# Patient Record
Sex: MALE | Race: WHITE | NOT HISPANIC OR LATINO | Employment: UNEMPLOYED | URBAN - METROPOLITAN AREA
[De-identification: names, ages, dates, MRNs, and addresses within clinical notes are randomized per-mention and may not be internally consistent; named-entity substitution may affect disease eponyms.]

---

## 2017-03-20 ENCOUNTER — OFFICE VISIT (OUTPATIENT)
Dept: SURGERY | Facility: CLINIC | Age: 27
End: 2017-03-20

## 2017-03-20 VITALS — OXYGEN SATURATION: 98 % | HEIGHT: 73 IN | BODY MASS INDEX: 32.68 KG/M2 | HEART RATE: 55 BPM | WEIGHT: 246.6 LBS

## 2017-03-20 DIAGNOSIS — N50.811 TESTICULAR PAIN, RIGHT: Primary | ICD-10-CM

## 2017-03-20 PROCEDURE — 99203 OFFICE O/P NEW LOW 30 MIN: CPT | Performed by: SURGERY

## 2017-03-20 RX ORDER — OSELTAMIVIR PHOSPHATE 75 MG/1
CAPSULE ORAL
Refills: 0 | COMMUNITY
Start: 2017-02-22 | End: 2017-03-20

## 2017-03-20 NOTE — PROGRESS NOTES
Chief Complaint   Patient presents with   • Hernia     NP Right inguinal hernia, painful        Subjective   Evaristo CECELIA Mark is a 27 y.o. male who presents for evaluation of some right-sided flank and lateral abdominal wall pain as well as right testicular pain.  The patient says this is been ongoing for about 2-1/2 weeks.  The pain at its worst is moderate.  It does not appear to be associated with any lifting or straining.  He does say that walking and being active may sometimes exacerbated, although not always.  Resting and laying around may make it better.  He describes an occasional burning sensation of the right testicle as well as a feeling almost like he has been kicked in the groin.  He does say that he has been involved in some different weightlifting exercises recently.      The following portions of the patient's history were reviewed and updated as appropriate: allergies, current medications, past family history, past medical history, past social history and past surgical history.     History reviewed. No pertinent past medical history.    Although appendectomy as listed in his history on the chart and the computer, the patient denies this to me.  He states that his only surgery was ankle fracture repair.    Past Surgical History   Procedure Laterality Date   • Appendectomy N/A 2014   • Orif ankle fracture Right 2014     Ankle fracture repair with hardware placed       No current outpatient prescriptions on file.    No Known Allergies    Family History   Problem Relation Age of Onset   • Hyperlipidemia Father    • Heart failure Father    • Heart failure Paternal Grandfather        Social History     Social History   • Marital status: Single     Spouse name: N/A   • Number of children: N/A   • Years of education: N/A     Occupational History   • Not on file.     Social History Main Topics   • Smoking status: Former Smoker     Years: 2.00     Types: Cigarettes     Quit date: 01/2017   • Smokeless tobacco:  "Not on file   • Alcohol use Yes      Comment: 1 drink on the weekend    • Drug use: Yes     Special: Marijuana   • Sexual activity: Defer     Other Topics Concern   • Not on file     Social History Narrative       Review of Systems   Constitutional: Positive for activity change. Negative for appetite change, fever and unexpected weight change.   Genitourinary: Positive for testicular pain. Negative for difficulty urinating, discharge, dysuria, genital sores and urgency.       Objective     Visit Vitals   • Pulse 55   • Ht 73\" (185.4 cm)   • Wt 246 lb 9.6 oz (112 kg)   • SpO2 98%   • BMI 32.53 kg/m2       Physical Exam   Constitutional: He appears well-developed and well-nourished.   Eyes: Conjunctivae and EOM are normal.   Neck: Neck supple. No tracheal deviation present.   Abdominal: Soft. Bowel sounds are normal. He exhibits no distension and no mass. There is no tenderness. Hernia confirmed negative in the ventral area, confirmed negative in the right inguinal area and confirmed negative in the left inguinal area.   Genitourinary: Penis normal. Right testis shows tenderness. Right testis shows no mass and no swelling. Left testis shows no mass and no tenderness.   Musculoskeletal: Normal range of motion. He exhibits no edema or deformity.   Psychiatric: He has a normal mood and affect. His behavior is normal. Thought content normal.           Assessment/Plan      This is a 27-year-old gentleman with what strikes me as being musculoskeletal pain is lateral abdominal wall on the right side with some radiation of this pain into the scrotum and testicle.  I think that some of the changes in his exercise regimen of likely brought this.  On physical examination I see no evidence of bulge or inguinal hernia.  He has no exacerbation of this pain when he is doing strenuous work and has never noticed a bulge.  I recommended to him rest and ibuprofen.  He may follow up with me as needed.       Kaiser Lowery MD  General " and Endoscopic Surgery  [facility]    [address], [address]  [place], [place], [address]  P: [phone]  F: [phone]

## 2017-08-28 ENCOUNTER — OFFICE VISIT (OUTPATIENT)
Dept: FAMILY MEDICINE CLINIC | Facility: CLINIC | Age: 27
End: 2017-08-28

## 2017-08-28 VITALS
TEMPERATURE: 98.1 F | HEIGHT: 74 IN | SYSTOLIC BLOOD PRESSURE: 137 MMHG | HEART RATE: 84 BPM | WEIGHT: 244 LBS | BODY MASS INDEX: 31.32 KG/M2 | DIASTOLIC BLOOD PRESSURE: 81 MMHG | RESPIRATION RATE: 16 BRPM

## 2017-08-28 DIAGNOSIS — F33.0 MILD EPISODE OF RECURRENT MAJOR DEPRESSIVE DISORDER (HCC): Primary | ICD-10-CM

## 2017-08-28 PROBLEM — F32.A DEPRESSION: Status: ACTIVE | Noted: 2017-08-28

## 2017-08-28 PROCEDURE — 99213 OFFICE O/P EST LOW 20 MIN: CPT | Performed by: FAMILY MEDICINE

## 2017-08-28 RX ORDER — PAROXETINE HYDROCHLORIDE 20 MG/1
20 TABLET, FILM COATED ORAL EVERY MORNING
Qty: 30 TABLET | Refills: 5 | Status: SHIPPED | OUTPATIENT
Start: 2017-08-28 | End: 2018-02-26 | Stop reason: SDUPTHER

## 2017-08-28 NOTE — PROGRESS NOTES
"Subjective   Evaristo Flores is a 27 y.o. male.     CC: Anxiety/Depression    History of Present Illness     Pt comes in today to discuss the above. Has had some increased depressive sx for some time and did start taking his GF's Paxil and is doing much with this. No SI/HI.    The following portions of the patient's history were reviewed and updated as appropriate: allergies, current medications, past family history, past medical history, past social history, past surgical history and problem list.    Review of Systems   Constitutional: Negative for activity change, chills, fatigue and fever.   Respiratory: Negative for cough and shortness of breath.    Cardiovascular: Negative for chest pain and palpitations.   Gastrointestinal: Negative for abdominal pain.   Endocrine: Negative for cold intolerance.   Psychiatric/Behavioral: Positive for dysphoric mood. Negative for behavioral problems. The patient is nervous/anxious.      /81  Pulse 84  Temp 98.1 °F (36.7 °C) (Oral)   Resp 16  Ht 74\" (188 cm)  Wt 244 lb (111 kg)  BMI 31.33 kg/m2    Objective   Physical Exam   Constitutional: He appears well-developed and well-nourished.   Neck: Neck supple. No thyromegaly present.   Cardiovascular: Normal rate and regular rhythm.    No murmur heard.  Pulmonary/Chest: Effort normal and breath sounds normal.   Abdominal: Bowel sounds are normal.   Psychiatric: He has a normal mood and affect. His behavior is normal.   Nursing note and vitals reviewed.      Assessment/Plan   Evaristo was seen today for anxiety and depression.    Diagnoses and all orders for this visit:    Mild episode of recurrent major depressive disorder  -     PARoxetine (PAXIL) 20 MG tablet; Take 1 tablet by mouth Every Morning.               "

## 2018-02-26 ENCOUNTER — OFFICE VISIT (OUTPATIENT)
Dept: FAMILY MEDICINE CLINIC | Facility: CLINIC | Age: 28
End: 2018-02-26

## 2018-02-26 VITALS
HEART RATE: 70 BPM | RESPIRATION RATE: 16 BRPM | HEIGHT: 73 IN | DIASTOLIC BLOOD PRESSURE: 74 MMHG | TEMPERATURE: 98.3 F | BODY MASS INDEX: 34.19 KG/M2 | WEIGHT: 258 LBS | SYSTOLIC BLOOD PRESSURE: 134 MMHG

## 2018-02-26 DIAGNOSIS — F33.0 MILD EPISODE OF RECURRENT MAJOR DEPRESSIVE DISORDER (HCC): ICD-10-CM

## 2018-02-26 PROCEDURE — 99212 OFFICE O/P EST SF 10 MIN: CPT | Performed by: FAMILY MEDICINE

## 2018-02-26 RX ORDER — PAROXETINE HYDROCHLORIDE 20 MG/1
20 TABLET, FILM COATED ORAL EVERY MORNING
Qty: 30 TABLET | Refills: 11 | Status: SHIPPED | OUTPATIENT
Start: 2018-02-26

## 2018-02-26 NOTE — PROGRESS NOTES
"Subjective   Evaristo Flores is a 28 y.o. male.     History of Present Illness     Chief Complaint:   Chief Complaint   Patient presents with   • Depression       Evaristo Flores 28 y.o. male who presents today for Medical Management of the below listed issues and medication refills.  he has a problem list of   Patient Active Problem List   Diagnosis   • Depression   .  Since the last visit, he has overall felt well.  he has been compliant with   Current Outpatient Prescriptions:   •  PARoxetine (PAXIL) 20 MG tablet, Take 1 tablet by mouth Every Morning., Disp: 30 tablet, Rfl: 11.  he denies medication side effects.    All of the chronic condition(s) listed above are stable w/o issues.    /74  Pulse 70  Temp 98.3 °F (36.8 °C) (Oral)   Resp 16  Ht 185.4 cm (73\")  Wt 117 kg (258 lb)  BMI 34.04 kg/m2    No results found for this or any previous visit.        The following portions of the patient's history were reviewed and updated as appropriate: allergies, current medications, past family history, past medical history, past social history, past surgical history and problem list.    Review of Systems   Constitutional: Negative for activity change, chills, fatigue and fever.   Respiratory: Negative for cough and shortness of breath.    Cardiovascular: Negative for chest pain and palpitations.   Gastrointestinal: Negative for abdominal pain.   Endocrine: Negative for cold intolerance.   Psychiatric/Behavioral: Negative for behavioral problems and dysphoric mood. The patient is not nervous/anxious.        Objective   Physical Exam   Constitutional: He appears well-developed and well-nourished.   Neck: Neck supple. No thyromegaly present.   Cardiovascular: Normal rate and regular rhythm.    No murmur heard.  Pulmonary/Chest: Effort normal and breath sounds normal.   Abdominal: Bowel sounds are normal.   Psychiatric: He has a normal mood and affect. His behavior is normal.   Nursing note and vitals " reviewed.      Assessment/Plan   Evaristo was seen today for depression.    Diagnoses and all orders for this visit:    Mild episode of recurrent major depressive disorder  -     PARoxetine (PAXIL) 20 MG tablet; Take 1 tablet by mouth Every Morning.